# Patient Record
(demographics unavailable — no encounter records)

---

## 2024-12-30 NOTE — ASSESSMENT
[FreeTextEntry1] : This is a case of a 37 yr right handed female with asthma, presents today for follow up.   Plan:  { \\} continue working on triggers: sleep, eating habits, stress  { \\} f/u 3 months  { \\} naproxen 500 mg   { \\} magnesium 400 mg and b 2 400 mg  Headache education provided: [] Stay well hydrated [] Limit excessive caffeine and alcohol intake [] Maintain good sleep hygiene [] Try to avoid triggers [] Practice good eating habits [] Avoid excessive use of over the counter pain medications, as they can cause medication overuse headaches [] Keep a headache diary [] Relaxation techniques, biofeedback, massage therapy, acupunctures, and heating pads may be effective  The above plan was discussed with Rosa Hernandez in great detail. Rosa David verbalized understanding and agrees with plan as detailed above. Patient was provided education and counselling on current diagnosis/symptoms. She was advised to call our clinic at [phone] for any new or worsening symptoms, or with any questions or concerns. In case of acute onset of neurological symptoms or worsening presentation, patient was advised to present to nearest emergency room for further evaluation. Rosa David expressed understanding and all her questions/concerns were addressed.

## 2024-12-30 NOTE — HISTORY OF PRESENT ILLNESS
[FreeTextEntry1] : This is a case of a 37 yr right handed female with asthma, presents today for headache.   Pt is here for a follow up.  Recently saw DR Laird-neuro opth for empty sella seen on MRI.  Headaches have been improved.  Pt started topiramate last visit for 2 week but had finger and hand tingling so she stopped the medication.  She felt like it helped but was unable tolerate. She feels headaches have been better although and only occurs once every 2 weeks since getting glasses.  She will use Tylenol or Motrin but sometimes only has slight relief.  She has also been working on my recommendations with sleep, exercise, and better eating.   She had had improved sleep and feels like it has helped.  Denies any new neurological symptoms.     Interval Sept 30, 2024 Pt has not had a history of headaches in the past. Pt went to ENT for tinnitus and hearing issues x 6 months. Pt also states headaches started around that time as well. Pt was sent for MRI wit IAC was negative for acute pathology but showed Nearly empty sella and tortuosity of the optic nerves. Her headaches occur 6/7 days a week. Headaches can last a couple hrs intermediately. Pain 6-8/10 pain. Located frontal and b/l temples. Described as pressure and sharpness. Denies n/v. Pt denies photophobia/phonophobia. Pt does reports blurred vision which she noticed about 4 months ago. Denies loss of vision or doubled. Denies extremity weakness or slurred speech. Denies any worsening with change of position. Denies any sensitivity to touch. Pt has not treated headaches with any medication. Triggers: family stress since 2022  pt sees a therapist  Occupation: Nurse - department of [employer] sleep: broken 3-4 hrs Allergies: NKA

## 2024-12-30 NOTE — PHYSICAL EXAM
[General Appearance - Alert] : alert [General Appearance - Well Nourished] : well nourished [Oriented To Time, Place, And Person] : oriented to person, place, and time [Affect] : the affect was normal [Person] : oriented to person [Place] : oriented to place [Cranial Nerves Oculomotor (III)] : extraocular motion intact [Cranial Nerves Facial (VII)] : face symmetrical [Motor Tone] : muscle tone was normal in all four extremities [Motor Strength] : muscle strength was normal in all four extremities [Balance] : balance was intact [Sclera] : the sclera and conjunctiva were normal [Neck Appearance] : the appearance of the neck was normal [] : no respiratory distress [Abnormal Walk] : normal gait [Skin Color & Pigmentation] : normal skin color and pigmentation